# Patient Record
Sex: FEMALE | Race: OTHER | HISPANIC OR LATINO | URBAN - METROPOLITAN AREA
[De-identification: names, ages, dates, MRNs, and addresses within clinical notes are randomized per-mention and may not be internally consistent; named-entity substitution may affect disease eponyms.]

---

## 2017-02-17 ENCOUNTER — EMERGENCY (EMERGENCY)
Facility: HOSPITAL | Age: 32
LOS: 1 days | Discharge: HOME | End: 2017-02-17
Attending: EMERGENCY MEDICINE | Admitting: EMERGENCY MEDICINE
Payer: COMMERCIAL

## 2017-02-17 VITALS
RESPIRATION RATE: 17 BRPM | TEMPERATURE: 98 F | SYSTOLIC BLOOD PRESSURE: 121 MMHG | DIASTOLIC BLOOD PRESSURE: 73 MMHG | HEART RATE: 71 BPM | OXYGEN SATURATION: 100 %

## 2017-02-17 VITALS — HEIGHT: 62 IN | WEIGHT: 100.09 LBS

## 2017-02-17 PROCEDURE — 73030 X-RAY EXAM OF SHOULDER: CPT | Mod: 26,RT

## 2017-02-17 PROCEDURE — 73010 X-RAY EXAM OF SHOULDER BLADE: CPT

## 2017-02-17 PROCEDURE — 73010 X-RAY EXAM OF SHOULDER BLADE: CPT | Mod: 26

## 2017-02-17 PROCEDURE — 99284 EMERGENCY DEPT VISIT MOD MDM: CPT

## 2017-02-17 PROCEDURE — 73030 X-RAY EXAM OF SHOULDER: CPT

## 2017-02-17 PROCEDURE — 99283 EMERGENCY DEPT VISIT LOW MDM: CPT

## 2017-02-17 RX ORDER — IBUPROFEN 200 MG
1 TABLET ORAL
Qty: 15 | Refills: 0 | OUTPATIENT
Start: 2017-02-17 | End: 2017-02-22

## 2017-02-17 RX ORDER — IBUPROFEN 200 MG
400 TABLET ORAL ONCE
Qty: 0 | Refills: 0 | Status: COMPLETED | OUTPATIENT
Start: 2017-02-17 | End: 2017-02-17

## 2017-02-17 RX ADMIN — Medication 400 MILLIGRAM(S): at 17:32

## 2017-02-17 NOTE — ED STATDOCS - MEDICAL DECISION MAKING DETAILS
Pt s/p fall 5 days ago c/o right shoulder pain. Will check X-ray, reevaluate and most likely d/c home.

## 2017-02-17 NOTE — ED STATDOCS - ATTENDING CONTRIBUTION TO CARE
I, Antonia Benson, performed the initial face to face bedside interview with this patient regarding history of present illness, review of symptoms and relevant past medical, social and family history.  I completed an independent physical examination.  I was the initial provider who evaluated this patient. I have signed out the follow up of any pending tests (i.e. labs, radiological studies) to the ACP.  I have communicated the patient’s plan of care and disposition with the ACP.  The history, relevant review of systems, past medical and surgical history, medical decision making, and physical examination was documented by the scribe in my presence and I attest to the accuracy of the documentation.

## 2017-02-17 NOTE — ED STATDOCS - CONSTITUTIONAL, MLM
normal... well appearing, well nourished, and in no apparent distress. Pt is comfortable with a normal respiratory rate. Pulse Ox 100%

## 2017-02-17 NOTE — ED STATDOCS - NS ED MD SCRIBE ATTENDING SCRIBE SECTIONS
HISTORY OF PRESENT ILLNESS/REVIEW OF SYSTEMS/HIV/VITAL SIGNS( Pullset)/PAST MEDICAL/SURGICAL/SOCIAL HISTORY/DISPOSITION/PHYSICAL EXAM

## 2017-02-17 NOTE — ED STATDOCS - OBJECTIVE STATEMENT
32 y/o F pt presents to ED c/o right shoulder pain. She fell in the parking lot at work 5 days ago. She also states she is having difficulty breathing. Pt denies LOC, headaches, vision changes, nausea, or vomiting. No further complaints at this time. NKDA.

## 2017-02-17 NOTE — ED STATDOCS - PROGRESS NOTE DETAILS
NP NOTE:  30 y/o F s/p fall 5 days ago onto right shoulder.  HPI, ROS, PE reviewed and agree with assessment.  No pain at present time.  PE S1S2 rr no murmur, lungs CTA/BL, abd + bs x 4, soft nt to palpation.  x-rays no acute fx or dislocation.  Will d/c home with referral to [facility] for f/u ibuprofen for pain.